# Patient Record
Sex: MALE | Race: WHITE
[De-identification: names, ages, dates, MRNs, and addresses within clinical notes are randomized per-mention and may not be internally consistent; named-entity substitution may affect disease eponyms.]

---

## 2019-03-13 ENCOUNTER — HOSPITAL ENCOUNTER (OUTPATIENT)
Dept: HOSPITAL 25 - ED | Age: 69
Setting detail: OBSERVATION
LOS: 1 days | Discharge: HOME | End: 2019-03-14
Attending: INTERNAL MEDICINE | Admitting: HOSPITALIST
Payer: MEDICARE

## 2019-03-13 ENCOUNTER — HOSPITAL ENCOUNTER (EMERGENCY)
Dept: HOSPITAL 25 - UCEAST | Age: 69
Discharge: ANOTHER HOSPITAL | End: 2019-03-13
Payer: MEDICARE

## 2019-03-13 VITALS — SYSTOLIC BLOOD PRESSURE: 179 MMHG | DIASTOLIC BLOOD PRESSURE: 100 MMHG

## 2019-03-13 DIAGNOSIS — Z79.82: ICD-10-CM

## 2019-03-13 DIAGNOSIS — R53.1: ICD-10-CM

## 2019-03-13 DIAGNOSIS — R47.81: ICD-10-CM

## 2019-03-13 DIAGNOSIS — I63.9: Primary | ICD-10-CM

## 2019-03-13 DIAGNOSIS — I10: ICD-10-CM

## 2019-03-13 LAB
ALBUMIN SERPL BCG-MCNC: 4.3 G/DL (ref 3.2–5.2)
ALBUMIN/GLOB SERPL: 1.3 {RATIO} (ref 1–3)
ALP SERPL-CCNC: 75 U/L (ref 34–104)
ALT SERPL W P-5'-P-CCNC: 18 U/L (ref 7–52)
ANION GAP SERPL CALC-SCNC: 7 MMOL/L (ref 2–11)
APTT PPP: 35.2 SECONDS (ref 26–36.3)
AST SERPL-CCNC: 20 U/L (ref 13–39)
BASOPHILS # BLD AUTO: 0 10^3/UL (ref 0–0.2)
BUN SERPL-MCNC: 19 MG/DL (ref 6–24)
BUN/CREAT SERPL: 17.6 (ref 8–20)
CALCIUM SERPL-MCNC: 9.4 MG/DL (ref 8.6–10.3)
CHLORIDE SERPL-SCNC: 105 MMOL/L (ref 101–111)
CHOLEST SERPL-MCNC: 147 MG/DL
EOSINOPHIL # BLD AUTO: 1.7 10^3/UL (ref 0–0.6)
GLOBULIN SER CALC-MCNC: 3.3 G/DL (ref 2–4)
GLUCOSE SERPL-MCNC: 109 MG/DL (ref 70–100)
HCO3 SERPL-SCNC: 26 MMOL/L (ref 22–32)
HCT VFR BLD AUTO: 44 % (ref 42–52)
HDLC SERPL-MCNC: 44.6 MG/DL
HGB BLD-MCNC: 14.4 G/DL (ref 14–18)
INR PPP/BLD: 1 (ref 0.77–1.02)
LYMPHOCYTES # BLD AUTO: 2.6 10^3/UL (ref 1–4.8)
MCH RBC QN AUTO: 31 PG (ref 27–31)
MCHC RBC AUTO-ENTMCNC: 33 G/DL (ref 31–36)
MCV RBC AUTO: 95 FL (ref 80–94)
MONOCYTES # BLD AUTO: 0.8 10^3/UL (ref 0–0.8)
NEUTROPHILS # BLD AUTO: 4.5 10^3/UL (ref 1.5–7.7)
NRBC # BLD AUTO: 0 10^3/UL
NRBC BLD QL AUTO: 0
PLATELET # BLD AUTO: 245 10^3/UL (ref 150–450)
POTASSIUM SERPL-SCNC: 3.9 MMOL/L (ref 3.5–5)
PROT SERPL-MCNC: 7.6 G/DL (ref 6.4–8.9)
RBC # BLD AUTO: 4.58 10^6/UL (ref 4–5.4)
SODIUM SERPL-SCNC: 138 MMOL/L (ref 135–145)
TRIGL SERPL-MCNC: 81 MG/DL
TROPONIN I SERPL-MCNC: 0 NG/ML (ref ?–0.04)
VIT C UR QL: (no result)
WBC # BLD AUTO: 9.6 10^3/UL (ref 3.5–10.8)

## 2019-03-13 PROCEDURE — 86901 BLOOD TYPING SEROLOGIC RH(D): CPT

## 2019-03-13 PROCEDURE — 86900 BLOOD TYPING SEROLOGIC ABO: CPT

## 2019-03-13 PROCEDURE — 80061 LIPID PANEL: CPT

## 2019-03-13 PROCEDURE — 83605 ASSAY OF LACTIC ACID: CPT

## 2019-03-13 PROCEDURE — 99213 OFFICE O/P EST LOW 20 MIN: CPT

## 2019-03-13 PROCEDURE — 85610 PROTHROMBIN TIME: CPT

## 2019-03-13 PROCEDURE — 86850 RBC ANTIBODY SCREEN: CPT

## 2019-03-13 PROCEDURE — 85730 THROMBOPLASTIN TIME PARTIAL: CPT

## 2019-03-13 PROCEDURE — 80053 COMPREHEN METABOLIC PANEL: CPT

## 2019-03-13 PROCEDURE — 70551 MRI BRAIN STEM W/O DYE: CPT

## 2019-03-13 PROCEDURE — 93005 ELECTROCARDIOGRAM TRACING: CPT

## 2019-03-13 PROCEDURE — G0463 HOSPITAL OUTPT CLINIC VISIT: HCPCS

## 2019-03-13 PROCEDURE — 70450 CT HEAD/BRAIN W/O DYE: CPT

## 2019-03-13 PROCEDURE — 99285 EMERGENCY DEPT VISIT HI MDM: CPT

## 2019-03-13 PROCEDURE — 36415 COLL VENOUS BLD VENIPUNCTURE: CPT

## 2019-03-13 PROCEDURE — 84484 ASSAY OF TROPONIN QUANT: CPT

## 2019-03-13 PROCEDURE — 93306 TTE W/DOPPLER COMPLETE: CPT

## 2019-03-13 PROCEDURE — 83036 HEMOGLOBIN GLYCOSYLATED A1C: CPT

## 2019-03-13 PROCEDURE — 70496 CT ANGIOGRAPHY HEAD: CPT

## 2019-03-13 PROCEDURE — 85025 COMPLETE CBC W/AUTO DIFF WBC: CPT

## 2019-03-13 PROCEDURE — 70498 CT ANGIOGRAPHY NECK: CPT

## 2019-03-13 PROCEDURE — 71045 X-RAY EXAM CHEST 1 VIEW: CPT

## 2019-03-13 PROCEDURE — G0378 HOSPITAL OBSERVATION PER HR: HCPCS

## 2019-03-13 PROCEDURE — 81003 URINALYSIS AUTO W/O SCOPE: CPT

## 2019-03-13 RX ADMIN — BETAMETHASONE VALERATE SCH: 1 CREAM TOPICAL at 18:29

## 2019-03-13 RX ADMIN — HEPARIN SODIUM SCH UNITS: 5000 INJECTION INTRAVENOUS; SUBCUTANEOUS at 22:17

## 2019-03-13 RX ADMIN — HEPARIN SODIUM SCH UNITS: 5000 INJECTION INTRAVENOUS; SUBCUTANEOUS at 15:52

## 2019-03-13 RX ADMIN — BETAMETHASONE VALERATE SCH APPLIC: 1 CREAM TOPICAL at 22:19

## 2019-03-13 NOTE — ECHO
Patient:      ROYA ALLEN  

Med Rec#:     [id number]            :          1950          

Date:         2019            Age:          68y                 

Account#:     [id number]          Height:       178 cm / 70.1 in

Accession#:   [id number]           Weight:       91 kg / 200.6 lbs

Sex:          M                     BSA:          2.09

Room#:        -11                 

Admit Date#:  2019          

Type:         Inpatient

 

Referring:    Rupert Rodgers MD

Reading:      Sena Bermeo MD

Sonographer:  Aide [facility]

CC:           Anselmo Coronel MD

______________________________________________________________________

 

Transthoracic Echocardiogram

 

Indication:

CVA

BP:           174/101

HR:           83

Rhythm:       NSR

 

Findings     

History:

Left hand weakness and slurred speech. 

 

Technical Comments:

The study quality is fair.   Completed at 1319. 

 

Left Ventricle:

The left ventricular chamber size is normal. Global left ventricular

wall motion and contractility are within normal limits. There is normal

left ventricular systolic function. The estimated ejection fraction is

60-65%.  The assessment of diastolic function is non-diagnostic. 

 

Left Atrium:

The left atrial chamber size is normal. 

 

Right Ventricle:

The right ventricular cavity size is normal. The right ventricular

global systolic function is normal. 

 

Right Atrium:

The right atrium is mildly dilated.  There is no patent foramen ovale

visualized. There is no evidence of patent foramen ovale shunting.  A

patent foramen ovale is not demonstrated with color Doppler and agitated

contrast.  

 

Aortic Valve:

The aortic valve is trileaflet. There is mild aortic regurgitation. 

There is no evidence of aortic stenosis. 

 

Mitral Valve:

The mitral valve leaflets appear normal. There is a trace of mitral

regurgitation. There is no evidence of mitral stenosis. 

 

Tricuspid Valve:

The tricuspid valve leaflets are normal.  There is trace to mild

tricuspid regurgitation. There is evidence that pulmonary hypertension

may be underestimated. There is no tricuspid stenosis. 

 

Pulmonic Valve:

The pulmonic valve appears normal. There is no evidence of pulmonic

regurgitation. There is no pulmonic stenosis.  

 

Pericardium:

A pericardial fat pad is visualized. 

 

Aorta:

There is no dilatation of the ascending aorta. There is no dilatation of

the aortic arch. There is no dilation of the aortic root. 

 

Pulmonary Artery:

The main pulmonary artery appears normal. 

 

Venous:

The inferior vena cava appears normal in size. There is a greater than

50% respiratory change in the inferior vena cava dimension. 

 

Contrast:

Intravenous agitated saline contrast was used to assess intracardiac

shunting.  

 

Conclusions

There is normal left ventricular systolic function.

Global left ventricular wall motion and contractility are within normal

limits.

The estimated ejection fraction is 60-65%. 

The right ventricular global systolic function is normal.

There is no evidence of patent foramen ovale shunting based on color

Doppler and bubble study, bubble study image quality is not optimal. 

There is mild aortic regurgitation. 

There is a trace of mitral regurgitation.

There is trace to mild tricuspid regurgitation.

No prior study to compare.

 

Measurements     

Name                    Value         Normal Range            

RVIDd (AP) 2D           3.5 cm        (0.9 - 2.6)             

RAd ISD 4CH             5 cm          (3.4 - 4.9)             

RA (A4C)W               2.8 cm        (2.9 - 4.6)             

IVSd (2D)               0.7 cm        (0.6 - 1)               

LVPWd (2D)              0.7 cm        (0.6 - 1)               

LVIDd (2D)              4.2 cm        (3.6 - 5.4)             

LVIDs (2D)              2.8 cm        -                        

LV FS (2D)              33 %          (25 - 45)               

Aortic Annulus          2.3 cm        (1.4 - 2.6)             

Ao root diameter (2D)   3.5 cm        (2.1 - 3.5)             

Ascending Ao            3 cm          (2.1 - 3.4)             

Aortic arch             2.9 cm        (1.8 - 3.4)             

Descending Ao           0.5 cm        -                        

LA dimension (AP) 2D    3.7 cm        (2.3 - 3.8)             

LAd ISD 4CH             4.5 cm        (2.9 - 5.3)             

LA ISD 4CH W            3.5 cm        (2.5 - 4.5)             

 

Name                    Value         Normal Range            

LA ESV SP 4CH (A/L)     15 ml         -                        

LA ESV SP 2CH (A/L)     16 ml         -                        

LA ESV BP (A/L) index   16 ml/m2      -                        

 

Name                    Value         Normal Range            

MV E-wave Vmax          0.7 m/sec     -                        

MV deceleration time    162 msec      -                        

MV A-wave Vmax          0.8 m/sec     -                        

MV E:A ratio            1.1 ratio     -                        

LV septal e' Vmax       0.06 m/sec    -                        

LV lateral e' Vmax      0.11 m/sec    -                        

 

Name                    Value         Normal Range            

AV Vmax                 1.4 m/sec     -                        

AV VTI                  26.7 cm       -                        

AV peak gradient        8 mmHg        -                        

AV mean gradient        4 mmHg        -                        

LVOT Vmax               1.3 m/sec     -                        

LVOT VTI                26.2 cm       -                        

LVOT peak gradient      6 mmHg        -                        

LVOT mean gradient      3 mmHg        -                        

AR PHT                  314 msec      -                        

 

Name                    Value         Normal Range            

TR Vmax                 2.8 m/sec     -                        

TR peak gradient        30 mmHg       -                        

RAP                     3 mmHg        -                        

RVSP                    33 mmHg       -                        

IVC diameter            1.6 cm        -                        

 

Name                    Value         Normal Range            

PV Vmax                 0.8 m/sec     -                        

PV peak gradient        3 mmHg        -                        

 

Electronically signed by: Sena Bermeo MD on 2019 19:01:05

## 2019-03-13 NOTE — UC
General HPI





- HPI Summary


HPI Summary: 





States he woke up at 2 am with slurred speech talking to his wife.  He had left 

hand weakness.  He also noted he was off balance.  No falls.  No syncope.  He 

woke at 2 am for a call to go in to work as an  - states his speech 

quickly resolved, but noticed that he couldn't write anything. States this 

morning his left arm is still weak, he is unable to grasp or write (he is left 

handed).  NO hx of CVA or HTN.  Has some right sided chest discomfort.  Meds: 

ASA 81 mg 





- History of Current Complaint


Stated Complaint: HEART/STROKE


Time Seen by Provider: 03/13/19 09:05





- Allergy/Home Medications


Allergies/Adverse Reactions: 


 Allergies











Allergy/AdvReac Type Severity Reaction Status Date / Time


 


No Known Allergies Allergy   Verified 03/13/19 09:14














PMH/Surg Hx/FS Hx/Imm Hx


Previously Healthy: Yes





Review of Systems


All Other Systems Reviewed And Are Negative: Yes


Neurovascular: Positive: Decreased Sensation, Other - left handed weakness, 

slurred speech





Physical Exam


Triage Information Reviewed: Yes


Appearance: Well-Appearing


Vital Signs Reviewed: Yes


ENT: Positive: Normal ENT inspection


Respiratory: Positive: Lungs clear, Normal breath sounds


Cardiovascular: Positive: RRR, Other: - soft systolic murmur


Abdomen Description: Positive: Nontender


Neurological: Positive: Other: - left sided weakness and protonator drift (3/5 

hand ) No speech impairment.   subtle left sided facial droop





Diagnostics





- EKG


Cardiac Rate: NL





Course/Dx





- Course


Course Of Treatment: This is a 68 yr old who woke up at 2 am with slurred 

speech and left upper extremity weakness.  Slurred speech resolved, left arm 

weakness persists.  EKG: NSR.   mg PO given.  Patient recommeneded to go 

to ER via EMS - which he agrees to.  High suspicion for CVA.  Sign out given to 

LOLLY Caceres at [facility] ED





- Diagnoses


Provider Diagnosis: 


 CVA (cerebral vascular accident)








Discharge





- Sign-Out/Discharge


Documenting (check all that apply): Patient Departure


All imaging exams completed and their final reports reviewed: No Studies





- Discharge Plan


Condition: Guarded


Disposition: TRANS HIGHER LVL OF CARE FAC


Referrals: 


Anselmo Coronel MD [Primary Care Provider] - 


Additional Instructions: 


Patient being transported to the ER via EMS for evaluation for an acute CVA.  





- Billing Disposition and Condition


Condition: GUARDED


Disposition: Trans Higher Lvl of Care Fac

## 2019-03-13 NOTE — ED
Neurological HPI





- HPI Summary


HPI Summary: 





This pt is a 69 y/o male presenting to [facility] via EMS for left hand weakness and 

slurred speech. Pt reports he is left handed and yesterday at around 1700 he 

began to notice his writing was deteriorating. He notes he got up at 0200 to 

speak with the  because he is an  and noticed slurred speech. 

Additionally states when he touched his face with his left hand it felt 

different. 


Denies hx of DM, HTN, high cholesterol.





- History of Current Complaint


Stated Complaint: POSS STROKE PER EMS


Hx Obtained From: Patient


Onset/Duration: Started hours ago, Still Present


Timing: Constant


Current Severity: Moderate


Neurological Deficit Location: Facial, LUE


Character: Weak - left hand, Other: - slurred speech


Aggravating: Nothing


Alleviating: Nothing


Associated Signs and Symptoms: Positive: Weakness - left hand, Impaired Speech 

- Slurred





- Allergy/Home Medications


Allergies/Adverse Reactions: 


 Allergies











Allergy/AdvReac Type Severity Reaction Status Date / Time


 


No Known Allergies Allergy   Verified 03/13/19 10:52














PMH/Surg Hx/FS Hx/Imm Hx


Endocrine/Hematology History: 


   Denies: Hx Diabetes, Hx Thyroid Disease


Cardiovascular History: 


   Denies: Hx Hypercholesterolemia, Hx Hypertension


Respiratory History: 


   Denies: Hx Asthma, Hx Chronic Obstructive Pulmonary Disease (COPD)


GI History: 


   Denies: Hx Ulcer





- Surgical History


Surgery Procedure, Year, and Place: rt hip, total


Infectious Disease History: 


   Denies: Hx Hepatitis, Hx Human Immunodeficiency Virus (HIV), Traveled 

Outside the  in Last 30 Days





- Family History


Family History: Cardiovascular.  Arthritis.





- Social History


Alcohol Use: None


Substance Use Type: Reports: None


Smoking Status (MU): Never Smoked Tobacco





Review of Systems


Negative: Fever, Chills


Respiratory: Negative


Gastrointestinal: Negative


Neurological: Other - POS: left hand weakness


Positive: Slurred Speech


All Other Systems Reviewed And Are Negative: Yes





Physical Exam





- Summary


Physical Exam Summary: 





GENERAL: Patient is a well-developed and nourished male who is lying 

comfortable in the stretcher. Patient is not in any acute respiratory distress.


HEAD AND FACE: Normocephalic


EYES: PERRLA, EOMI x 2.


EARS: Hearing grossly intact.


MOUTH: Oropharynx within normal limits.


NECK: Supple, trachea is midline, no adenopathy, no JVD, no carotid bruit.


CHEST: Symmetric, no tenderness at palpation


LUNGS: Clear to auscultation bilaterally. No wheezing or crackles.


CVS: Regular rate and rhythm, S1 and S2 present, no murmurs or gallops 

appreciated.


ABDOMEN: Soft, non-tender. Bowel sounds are normal. No abdominal abnormal 

pulsations.


EXTREMITIES: Full ROM in all major joints, no edema, no cyanosis or clubbing.


NEURO: Alert and oriented x 3. Speech is normal and follows commands. Left 

upper extremity drift. Minor left sided facial droop. NIH score is 2. 


SKIN: Dry and warm


Triage Information Reviewed: Yes


Vital Signs On Initial Exam: 





 Initial Vitals











Temp Pulse Resp BP Pulse Ox


 


 98.6 F   82   16   175/96   98 


 


 03/13/19 10:01  03/13/19 10:01  03/13/19 10:01  03/13/19 10:01  03/13/19 10:01








Vital Signs Reviewed: Yes





- Alexis Coma Scale


Best Eye Response: 4 - Spontaneous


Best Motor Response: 6 - Obeys Commands


Best Verbal Response: 5 - Oriented


Coma Scale Total: 15





Diagnostics





- Laboratory


Result Diagrams: 


 03/13/19 09:10





 03/13/19 09:10


Lab Statement: Any lab studies that have been ordered have been reviewed, and 

results considered in the medical decision making process.





- Radiology


  ** Chest XR


Radiology Interpretation Completed By: Radiologist


Summary of Radiographic Findings: IMPRESSION: No active cardiopulmonary 

disease. Dr. Hand has reviewed this report.





- CT


  ** Brain CT


CT Interpretation Completed By: Radiologist


Summary of CT Findings: IMPRESSION: No evidence for gross acute infarct, mass 

effect, or hemorrhage. Dr. Hand has reviewed this report.





  ** Head CTA


CT Interpretation Completed By: Radiologist


Summary of CT Findings: IMPRESSION: 1. Mural irregularity of the intracranial 

arterial circulation was pronounced within the right MCA M3 segment suggestive 

of intracranial atherosclerosis. 2. No aneurysm, vascular malformation, 

occlusion, or stenosis of the visualized intracranial circulation. 3. No 

internal carotid artery stenosis by nascet criteria. 4. Mediastinum 

lymphadenopathy. Recommend consideration of dedicated imaging of the chest.  

Preliminary findings were discussed with Dr. Rodgers at approximately 11:05 AM 

on March 13, 2019.  Dr. Hand has reviewed this report.





- EKG


  ** 10:11


Cardiac Rate: NL - at 82 bpm


EKG Rhythm: Sinus Rhythm


EKG Comparison: Other - No old EKG to compare.


Summary of EKG Findings: Early R wave progression. Left axis deviation.





NIH Scale





- NIH Scale


Level of Consciousness: Alert/Keenly Responsive


Ask Patient the Month and His/Her Age: Both Correct


Ask Pt to Open/Close Eyes and /Release Non-Paretic Hand: Both Correctly


Best Gaze (Only Horizontal Eye Movement): Normal


Visual Field Testing: No Visual Loss


Facial Paresis-Pt to Smile & Close Eyes or Grimace Symmetry: Minor Paralysis


Motor Function - Right Arm: No Drift-Holds 10 Seconds


Motor Function - Left Arm: Drifts LT 10 seconds


Motor Function - Right Leg: No Drift-Holds 10 Seconds


Motor Function - Left Leg: No Drift-Holds 10 Seconds


Limb Ataxia-Must be out of Proportion to Weakness Present: Absent


Sensory (Use Pinprick to Test Arms/Legs/Trunk/Face): Normal


Best Language (Describe Picture, Name Items): No Aphasia


Dysarthria (Read Several Words): Normal


Extinction and Inattention: No Abnormality


Total Score: 2





Re-Evaluation





- Re-Evaluation


  ** First Eval


Re-Evaluation Time: 10:32


Comment: Dr. Rodgers, neurologist, at bedside.





  ** Second Eval


Re-Evaluation Time: 11:06


Comment: Dr. Rodgers reports CTA is negative.





Course/Dx





- Course


Course Of Treatment: Dr. Hand to pt at door at 0945. Justin Wells called at 0947. 

Pt to CT at 0947. Brain CT ordered at 0948. Pt back from CT at 0958. Dr. Taylor 

calls to report CT results at 1001. Dr. Rodgers wants a CTA at 1017.


Assessment/Plan: Pt is a 69 y/o male presenting to [facility] via EMS for left hand 

weakness and slurred speech.  NIH scale score is 2.  Brain CT shows no evidence 

for gross acute infarct, mass effect, or hemorrhage.  Dr. Rodgers recommends to 

order a head CTA. Please see CTA results.  Dr. Rodgers came and evaluated the 

pt. Pt not a candidate for TPA.  Dr. Rodgers recommends admission to the 

hospitalist.  Case discussed with Dr. Elise, hospitalist, who accepted the pt 

for admission.  The patient agrees with this plan.





- Diagnoses


Provider Diagnoses: 


 CVA (cerebral vascular accident)





During the Visit The Following Alert/Code Occurred: Code Grey - 09:47





- Physician Notifications


Discussed Care Of Patient With: Roman Taylor


Time Discussed With Above Provider: 10:01


Instructed by Provider To: Other - Dr. Taylor, radiologist, reports negative 

brain CT results. [11:26] Discussed the case with Dr. Elise, hospitalist, who 

accepted the pt for admission.





- Critical Care Time


Critical Care Time: 30-74 min





Discharge





- Sign-Out/Discharge


Documenting (check all that apply): Patient Departure - Admit to [facility]


Patient Received Moderate/Deep Sedation with Procedure: No





- Discharge Plan


Condition: Stable


Disposition: ADMITTED TO [facility]





- Billing Disposition and Condition


Condition: STABLE


Disposition: Admitted to [facility]





- Attestation Statements


Document Initiated by Scribe: Yes


Documenting Scribe: Suzie Ellis


Provider For Whom Scribe is Documenting (Include Credential): Namrata Hand MD


Scribe Attestation: 


Suzie ALCAZAR scribed for Namrata Hand MD on 03/13/19 at 1649. 


Scribe Documentation Reviewed: Yes


Provider Attestation: 


The documentation as recorded by the Suzie rivera accurately reflects 

the service I personally performed and the decisions made by Namrata mark MD


Status of Scribe Document: Viewed

## 2019-03-14 VITALS — DIASTOLIC BLOOD PRESSURE: 79 MMHG | SYSTOLIC BLOOD PRESSURE: 136 MMHG

## 2019-03-14 LAB
ALBUMIN SERPL BCG-MCNC: 3.8 G/DL (ref 3.2–5.2)
ALBUMIN/GLOB SERPL: 1.3 {RATIO} (ref 1–3)
ALP SERPL-CCNC: 65 U/L (ref 34–104)
ALT SERPL W P-5'-P-CCNC: 17 U/L (ref 7–52)
ANION GAP SERPL CALC-SCNC: 7 MMOL/L (ref 2–11)
AST SERPL-CCNC: 17 U/L (ref 13–39)
BASOPHILS # BLD AUTO: 0.1 10^3/UL (ref 0–0.2)
BUN SERPL-MCNC: 18 MG/DL (ref 6–24)
BUN/CREAT SERPL: 17.6 (ref 8–20)
CALCIUM SERPL-MCNC: 9.3 MG/DL (ref 8.6–10.3)
CHLORIDE SERPL-SCNC: 109 MMOL/L (ref 101–111)
CHOLEST SERPL-MCNC: 136 MG/DL
EOSINOPHIL # BLD AUTO: 1.9 10^3/UL (ref 0–0.6)
GLOBULIN SER CALC-MCNC: 2.9 G/DL (ref 2–4)
GLUCOSE SERPL-MCNC: 95 MG/DL (ref 70–100)
HCO3 SERPL-SCNC: 24 MMOL/L (ref 22–32)
HCT VFR BLD AUTO: 40 % (ref 42–52)
HDLC SERPL-MCNC: 38.4 MG/DL
HGB BLD-MCNC: 13.4 G/DL (ref 14–18)
LYMPHOCYTES # BLD AUTO: 3.5 10^3/UL (ref 1–4.8)
MCH RBC QN AUTO: 31 PG (ref 27–31)
MCHC RBC AUTO-ENTMCNC: 33 G/DL (ref 31–36)
MCV RBC AUTO: 94 FL (ref 80–94)
MONOCYTES # BLD AUTO: 0.9 10^3/UL (ref 0–0.8)
NEUTROPHILS # BLD AUTO: 3.5 10^3/UL (ref 1.5–7.7)
NRBC # BLD AUTO: 0 10^3/UL
NRBC BLD QL AUTO: 0
PLATELET # BLD AUTO: 233 10^3/UL (ref 150–450)
POTASSIUM SERPL-SCNC: 4.1 MMOL/L (ref 3.5–5)
PROT SERPL-MCNC: 6.7 G/DL (ref 6.4–8.9)
RBC # BLD AUTO: 4.31 10^6/UL (ref 4–5.4)
SODIUM SERPL-SCNC: 140 MMOL/L (ref 135–145)
TRIGL SERPL-MCNC: 113 MG/DL
WBC # BLD AUTO: 9.8 10^3/UL (ref 3.5–10.8)

## 2019-03-14 RX ADMIN — BETAMETHASONE VALERATE SCH APPLIC: 1 CREAM TOPICAL at 09:17

## 2019-03-14 RX ADMIN — HEPARIN SODIUM SCH UNITS: 5000 INJECTION INTRAVENOUS; SUBCUTANEOUS at 06:09

## 2019-03-14 NOTE — CONS
CC:  Dr. Coronel*

 

NEUROLOGY CONSULTATION:

 

DATE OF FOLLOWUP:  03/14/19

 

LOCATION:  He is inpatient, room 431.

 

HOSPITALIST:  Dr. Ruiz.

 

CHIEF COMPLAINT:  Left-sided weakness.

 

INTERVAL HISTORY:  Since yesterday Mc feels that there is improvement in 
his speech and left hand dysfunction.  He notes he can handwrite better.  He 
feels reasonably steady on his feet other than his chronic hip pain.

 

MEDICATIONS:  Medications were reviewed and he is now on:

1.  Plavix 75 mg p.o. daily.

2.  Aspirin 81 mg p.o. daily.

3.  Atorvastatin 80 mg p.o. daily.

4.  Heparin subcutaneous 5000 units q.8 hours.

5.  Amlodipine 5 mg p.o. daily.

 

PHYSICAL EXAM:  On exam, he has been afebrile throughout his hospital stay 
other than an axillary temperature of 99.4 late last evening.  Blood pressure 
most recently 140/90, heart rate in the 70s and regular, respiratory rate is 18 
and oxygen saturation is 97% on room air.  Neurological:  Facial musculature is 
notable for trace flattening of the left naso-labial fold.  Speech is clear 
without dysarthria.  Motor exam reveals good strength other than mild left 
wrist extensor and finger extensor weakness.  He has good strength in the legs.
  Gait is notable for an antalgic gait because of the hip pain.  He has slow 
and slightly clumsy finger taps in the left hand.  There is no tremor. Language 
is fluent and memory is intact.

 

DIAGNOSTIC STUDIES/LAB DATA:  Additional laboratory date since yesterday 
notable for a fasting cholesterol this morning of 136 with an LDL of 75.  
Chemistry profile is otherwise unremarkable and hemoglobin A1c is 5.4%.

 

Transthoracic echocardiogram did not reveal any significant abnormalities.  MRI 
scan of the brain reveals a multiple foci of acute infarction in the 
distribution of the right middle cerebral artery.

 

IMPRESSION:  Impression is that of a right hemisphere stroke with improved 
deficits.  He is on dual antiplatelet therapy with intention to continue that 
for about 30 days and then switch him to antiplatelet monotherapy.  His lipids 
are generally good, but given the atherosclerotic changes on his CT angiogram 
and his stroke, I think statin is appropriate to decrease risk of further 
vascular events in the future.  There is no indication that he has diabetes.

 

PLAN:  I think he could go home.  I will see him in followup in my office in a 
few weeks to switch him to platelet monotherapy and to reassess him.  I 
recommend that he have outpatient occupational therapy and speech therapy.  I 
also recommend that he have more extensive cardiac monitoring to look for 
intermittent atrial fibrillation.  I will arrange this through my office.  I 
explained this all Mc including the medical therapies recommended and the 
need for followup.

 

 [id number]/[facility] #: [id number]

MTDD

## 2019-03-14 NOTE — CONS
CC:  Dr. Anselmo Coronel *

 

NEUROLOGY CONSULTATION:

 

DATE OF CONSULT:  03/13/19

 

LOCATION:  He is in the emergency room and was admitted to room 431.

 

REFERRING PROVIDER:  Erna Mcknight NP.

 

CHIEF COMPLAINT:  Left-sided weakness.

 

HISTORY OF PRESENT ILLNESS:  Mc Chowdary is a 68-year-old left-handed 
, who noted problems with writing at 5 p.m. the day before admission.  He did 
not think too much of it and went home and went to sleep.  He awoke at 2 in the 
morning because of a phone call and in his capacity as an .  His speech 
felt slurred to him.  He woke up the following morning and noted clumsiness and 
weakness of his left hand and slurred speech.  He spoke with his wife, who 
noticed his deficits and he went to urgent care, I believe.  He was then 
transported by ambulance to the emergency room.  In the emergency room, he was 
well over 12 hours past last known well.  Also, in the emergency room, his wife 
reported that he had been forgetful for a couple of days.  He typically is very 
methodical and has a good memory, but he was misplacing things such as his 
keys.  His symptoms therefore may have started even prior to 5 p.m. the day 
before admission.

 

There is no prior history of stroke, heart disease, hypertension, or diabetes.  
He is a nonsmoker.  He does not take any medications on a regular basis.  There 
is no history of head trauma or epilepsy.

 

PAST MEDICAL HISTORY:  Notable for hip replacement.  He has persistent problems 
with his other hip, but has declined hip replacement for that one up to this 
point.

 

MEDICATIONS:  He is not on any medications at home.

 

ALLERGIES:  He does not have any drug allergies.

 

FAMILY HISTORY:  Noncontributory.

 

SOCIAL HISTORY:  He is a nonsmoker.  Does not drink alcohol.  He lives with his 
wife.

 

REVIEW OF SYSTEMS:  Negative for cardiac, pulmonary, renal, GI, or  disease.  
No problems with his memory.  His gait is impaired only by his hip arthritis.  
No recent infections.

 

PHYSICAL EXAM:  In the emergency room, blood pressure is 160/90, heart rate 80 
and regular, respiratory rate 20, oxygen saturation 97% on room air.  Heart was 
in a regular rate and rhythm without murmurs.  Lungs were clear bilaterally.  
Carotid pulses were present and there are no cervical bruits.

 

On neurological exam, pupils, fundi, and eye movements were normal.  Visual 
fields are full to confrontation.  Facial musculature is notable for mild 
flattening of the left nasolabial fold.  Palate and tongue were notable for 
mild left palatal weakness and mild dysarthria.  Tongue protruded in the 
midline.  Hearing is diminished bilaterally.

 

Motor exam reveals mild weakness of the left hand and arm with left pronator 
drift. There was resistive strength.  He has mild left hip flexor weakness as 
well.  There is pain with testing about the left hip, however.  Finger taps are 
clumsy in the left hand.  He had normal strength and coordination on the right 
side.

 

Sensation to light touch and pin discrimination is intact and symmetric on both 
sides of the face, arms, and legs.

 

Reflexes are hypoactive, but present.  Plantar responses were flexor 
bilaterally.

 

Heel-to-shin maneuver was normal on the right, but was limited by pain and 
limited range of motion on the left.

 

He was alert and oriented and a good detailed historian.  Memory was intact and 
language is fluent.  He has normal attention, concentration, and good fund of 
knowledge.

 

NIH Score is 4.

 

DIAGNOSTIC STUDIES/LAB DATA:  Laboratory data at the time of the emergency room 
evaluation was notable for a negative CT scan of the brain.  I thought there 
might have been a small old infarction in the right basal ganglia, but the 
interpretation was normal.

 

He had a CT angiogram of the neck and brain while still in the emergency room, 
which did not reveal any large vessel disease.  There were some irregularities 
of the tertiary branches of the right middle cerebral artery intracranially 
suggestive of atherosclerotic disease.  There were some enlarged mediastinal 
lymph nodes measuring up to 1.9 cm.

 

Laboratory data was notable for a normal CBC, normal chemistry profile other 
than nonfasting glucose of 109.  Cholesterol in the emergency room 147 and LDL 
86.

 

Urinalysis unremarkable.

 

IMPRESSION AND PLAN:  Impression is that of a right hemisphere subcortical 
stroke. There is no evidence of large vessel disease and he is well outside of 
the tPA window.

 

He is to be admitted.  I put in orders to load him with Plavix 300 mg and 
aspirin 325 mg with intention of treating him with dual antiplatelet therapy 
for 30 days. I put in orders for hemoglobin A1c and fasting lipids for the 
morning.  I also put in orders for an MRI scan and a transthoracic 
echocardiogram.  I explained to Mc and his wife that at he is having 
symptoms of a stroke and he needs to be admitted to the hospital.  He will be 
put on telemetry.  If he shows evidence of a cardioembolic source, then clearly 
therapeutic recommendations will change.

 

 [id number]/CPS #: [id number]

SHANNA

## 2019-03-15 NOTE — DS
CC:  Anselmo Coronel MD; Dr. Rupert Rodgers *

 

DISCHARGE SUMMARY:

 

DATE OF ADMISSION:  03/13/19

 

DATE OF DISCHARGE:  03/14/19

 

PRIMARY CARE PHYSICIAN:  Anselmo Coronel MD

 

DISPOSITION:  Home.

 

CONDITION:  Improved.

 

PRIMARY DIAGNOSIS:  Ischemic stroke.

 

CONSULTS:  Neurology, Dr. Rupert Rodgers.

 

PERTINENT STUDIES:  

Brain CT on 03/13/19 had no evidence for acute infarct, mass effect or 
hemorrhage.  

Brain MRI showed small areas consistent with a nonhemorrhagic infarct in the 
posterior right frontal and anterior right parietal lobes.  

Transthoracic echocardiogram showed normal left ventricular systolic function 
with an EF of 60% to 65%, no evidence of patent foramen ovale shunting, mild 
aortic regurgitation.

 

HISTORY OF PRESENT ILLNESS:  This is a 68-year-old man without significant past 
medical history who awoke in the middle of the night prior to presentation and 
noticed he was slurring his words and had some irregularities of his left hand. 
Notably, his fingers have loss of sensation and he had issues with his left 
 and was writing with his left hand.  Of note he is left-handed.  His 
symptoms improved with him focusing on his speech and he was able to go to work 
that night and he states that nobody noticed he had slurring of his speech; 
however, his wife at bedside notes that when he had called her, she thought 
that he sounded drunk and she had never heard him sound like that before.  By 
the next morning, he had presented to the emergency room for evaluation.

 

HOSPITAL COURSE:  He was admitted to the observation unit.  Brain MRI was 
positive for stroke and the patient was evaluated by Dr. Rodgers of Neurology.  
The patient was started on aspirin, Plavix and a statin.  He also required HTN 
medication.  By  morning of discharge, the patient reports significant 
improvements in motor function of his left arm and speech, although wife notes 
that his speech is still slower and that he is hesitating more with words and 
she can tell that he is focusing on how to say those words correctly. Otherwise
, 10-review of systems negative.

 

PHYSICAL EXAMINATION:  A well-appearing man, reclining on the bed working on 
his laptop.  Heart:  Regular rate and rhythm.  No murmurs, gallops or rubs.  
Lungs: Clear to auscultation bilaterally.  Abdomen:  Soft, nontender and 
nondistended.  Extremities:  No lower extremity edema noted.  Neurologic:  
Cranial nerves II through XII intact.  The patient has 4/5 strength in 
abduction of fingers of left hand with 4/5 strength of elbow flexion and 
extension.  He is positive for pronator drift on the left.  Strength is 
preserved in the right arm and hand.  The patient has an abnormal gait given 
history of hip osteoarthritis and states that his gait is at baseline.  He was 
alert and oriented x3, memory was intact and language was fluent per my exam, 
although wife thinks that he has slight impairment of language.

 

DISCHARGE PLAN:  The patient is to follow up with his PCP and also Neurology.  
He was given information and a referral to outpatient occupational therapy and 
speech therapy.  His outpatient neurologist will order cardiac monitor to 
evaluate for atrial fibrillation, which was not seen during his brief time on 
telemetry while admitted to the hospital.

 

OUTPATIENT MEDICATIONS:  Include:

1.  Aspirin 81 daily.

2.  Plavix 75 daily.

3.  Atorvastatin 80 mg daily.

4.  Amlodipine 5 mg daily.

 

The patient and his wife were educated on return precautions including 
worsening neurologic findings that would warrant return to the hospital.

 

TIME SPENT:  Approximately 60 minutes were spent on discharge of this patient, 
more than half of which was spent with care coordination or at bedside for 
interview and exam.

 

 [id number]/CPS #: [id number]

MTDD